# Patient Record
Sex: MALE | Race: NATIVE HAWAIIAN OR OTHER PACIFIC ISLANDER
[De-identification: names, ages, dates, MRNs, and addresses within clinical notes are randomized per-mention and may not be internally consistent; named-entity substitution may affect disease eponyms.]

---

## 2018-07-28 ENCOUNTER — HOSPITAL ENCOUNTER (EMERGENCY)
Dept: HOSPITAL 44 - ED | Age: 23
Discharge: HOME | End: 2018-07-28
Payer: SELF-PAY

## 2018-07-28 VITALS — SYSTOLIC BLOOD PRESSURE: 140 MMHG | DIASTOLIC BLOOD PRESSURE: 68 MMHG

## 2018-07-28 DIAGNOSIS — L02.91: Primary | ICD-10-CM

## 2018-07-28 PROCEDURE — 99282 EMERGENCY DEPT VISIT SF MDM: CPT

## 2018-07-28 PROCEDURE — 90715 TDAP VACCINE 7 YRS/> IM: CPT

## 2018-07-28 PROCEDURE — 90471 IMMUNIZATION ADMIN: CPT

## 2018-07-28 NOTE — ED PHYSICIAN DOCUMENTATION
General Adult





- HISTORIAN


Historian: patient





- HPI


Stated Complaint: ? Insect Bite to the Back


Chief Complaint: General Adult


Onset: days ago (7)


Timing: still present, worse since


Further Comments: yes (Pt is a 23 yo male with an abscess on his L upper back 

that he has had for about a week.  Pt has not had n/v/fever.  Pt applied 

topical abx, but this was not effective.)





- ROS


CONST: no problems


EYES/ENT: none


CVS/RESP: none


GI/: none


MS/SKIN/LYMPH: other (abscess L upper back)





- PAST HX


Past History: none


Allergies/Adverse Reactions: 


 Allergies











Allergy/AdvReac Type Severity Reaction Status Date / Time


 


No Known Allergies Allergy   Unverified 07/28/18 16:55














Home Medications: 


 Ambulatory Orders











 Medication  Instructions  Recorded


 


NK [NK]  07/28/18














- SOCIAL HX


Smoking History: cigarettes





- FAMILY HX


Family History: No





- VITAL SIGNS


Vital Signs: 





 Vital Signs











Temp Pulse Resp BP Pulse Ox


 


 98.4 F   97 H  18   157/77   98 


 


 07/28/18 16:45  07/28/18 16:45  07/28/18 16:45  07/28/18 16:45  07/28/18 16:45














- REVIEWED ASSESSMENTS


Nursing Assessment  Reviewed: Yes


Vitals Reviewed: Yes





Progress





- Progress


Progress: 





Rx Doxycycline 100 mg.  Take one every 12 hrs for 10 days.  Avoid prolonged sun 

exposure while taking this medication.





General Adult Physical Exam





- PHYSICAL EXAM


GENERAL APPEARANCE: no distress


EENT: pharynx normal


NECK: normal inspection, supple


RESPIRATORY: no resp distress, chest non-tender, breath sounds normal


CVS: reg rate & rhythm, heart sounds normal


ABDOMEN: soft, no organomegaly, normal bowel sounds


BACK: other (4 cm abscess, L upper back with mild induraion and some central 

darkening)


SKIN: other (4 cm abscess, L upper back with mild induraion and some central 

darkening)


EXTREMITIES: non-tender, normal range of motion, no evidence of injury


NEURO: oriented X3, motor nml, sensation nml





Discharge


Clincal Impression: 


 abscess, possible insect bite





Referrals: 


Primary Doctor,No [Primary Care Provider] - 2 Days


Condition: Stable


Disposition: 01 HOME, SELF-CARE


Decision to Admit: NO


Decision Time: 17:02